# Patient Record
Sex: FEMALE | Race: WHITE | URBAN - METROPOLITAN AREA
[De-identification: names, ages, dates, MRNs, and addresses within clinical notes are randomized per-mention and may not be internally consistent; named-entity substitution may affect disease eponyms.]

---

## 2017-04-19 ENCOUNTER — OFFICE VISIT (OUTPATIENT)
Dept: INTERNAL MEDICINE CLINIC | Age: 52
End: 2017-04-19

## 2017-04-19 VITALS
HEIGHT: 69 IN | OXYGEN SATURATION: 95 % | RESPIRATION RATE: 20 BRPM | SYSTOLIC BLOOD PRESSURE: 141 MMHG | DIASTOLIC BLOOD PRESSURE: 87 MMHG | TEMPERATURE: 97.7 F | WEIGHT: 293 LBS | BODY MASS INDEX: 43.4 KG/M2 | HEART RATE: 95 BPM

## 2017-04-19 DIAGNOSIS — R60.9 EDEMA, UNSPECIFIED TYPE: ICD-10-CM

## 2017-04-19 DIAGNOSIS — I10 ESSENTIAL HYPERTENSION: ICD-10-CM

## 2017-04-19 DIAGNOSIS — Z12.31 SCREENING MAMMOGRAM, ENCOUNTER FOR: ICD-10-CM

## 2017-04-19 DIAGNOSIS — E55.9 VITAMIN D DEFICIENCY: ICD-10-CM

## 2017-04-19 DIAGNOSIS — H66.93 BILATERAL OTITIS MEDIA, UNSPECIFIED CHRONICITY, UNSPECIFIED OTITIS MEDIA TYPE: Primary | ICD-10-CM

## 2017-04-19 DIAGNOSIS — R73.03 PRE-DIABETES: ICD-10-CM

## 2017-04-19 DIAGNOSIS — J45.909 UNCOMPLICATED ASTHMA, UNSPECIFIED ASTHMA SEVERITY: ICD-10-CM

## 2017-04-19 RX ORDER — METFORMIN HYDROCHLORIDE 500 MG/1
500 TABLET ORAL 2 TIMES DAILY WITH MEALS
Qty: 60 TAB | Refills: 2 | Status: SHIPPED | OUTPATIENT
Start: 2017-04-19

## 2017-04-19 RX ORDER — AMOXICILLIN AND CLAVULANATE POTASSIUM 875; 125 MG/1; MG/1
1 TABLET, FILM COATED ORAL 2 TIMES DAILY
Qty: 20 TAB | Refills: 0 | Status: SHIPPED | OUTPATIENT
Start: 2017-04-19 | End: 2017-04-29

## 2017-04-19 RX ORDER — LOSARTAN POTASSIUM 50 MG/1
50 TABLET ORAL DAILY
Qty: 30 TAB | Refills: 2 | Status: SHIPPED | OUTPATIENT
Start: 2017-04-19 | End: 2017-07-14 | Stop reason: SDUPTHER

## 2017-04-19 RX ORDER — BUDESONIDE AND FORMOTEROL FUMARATE DIHYDRATE 160; 4.5 UG/1; UG/1
2 AEROSOL RESPIRATORY (INHALATION) 2 TIMES DAILY
Qty: 1 INHALER | Refills: 3 | Status: SHIPPED | OUTPATIENT
Start: 2017-04-19 | End: 2017-07-14 | Stop reason: SDUPTHER

## 2017-04-19 RX ORDER — ERGOCALCIFEROL 1.25 MG/1
50000 CAPSULE ORAL
Qty: 4 CAP | Refills: 2 | Status: SHIPPED | OUTPATIENT
Start: 2017-04-19 | End: 2017-07-21 | Stop reason: SDUPTHER

## 2017-04-19 RX ORDER — FUROSEMIDE 20 MG/1
TABLET ORAL
Qty: 30 TAB | Refills: 1 | Status: SHIPPED | OUTPATIENT
Start: 2017-04-19 | End: 2017-07-14 | Stop reason: SDUPTHER

## 2017-04-19 NOTE — PROGRESS NOTES
HISTORY OF PRESENT ILLNESS  Olga Cueva is a 47 y.o. female. HPI Ms. Alejandro Barrera is here for c/o Left ear pain. She also has been off all her meds for several months and just restarted Monday. She does complain of leg cramping when she takes the maxzide or lasix. She was seen recently at [facility] for cellulitis of her leg and she mentioned the leg cramping. Labs were done and were normal.  She was treated with Keflex and is still on it. I will d/c maxzide due to cramping she experiences every time she take it and replace with alternative medication. She does not want an ACE b/c she believes she had a rash or developed some redness on her arm after taking it in the past.     Review of Systems   Constitutional: Negative. HENT: Positive for ear pain. Eyes: Negative. Respiratory: Positive for wheezing (occasional with asthma). Negative for cough. Cardiovascular: Positive for leg swelling. Gastrointestinal: Negative. Musculoskeletal: Positive for myalgias (intermittent leg cramping. ). Physical Exam   Constitutional: She is oriented to person, place, and time. She appears well-developed and well-nourished. No distress. HENT:   Head: Normocephalic and atraumatic. Right Ear: Tympanic membrane is erythematous. Left Ear: Tympanic membrane is erythematous (left ear is more severe than right). Cardiovascular: Normal rate and regular rhythm. No murmur heard. Pulmonary/Chest: Effort normal. She has no wheezes. Musculoskeletal: She exhibits edema. Neurological: She is alert and oriented to person, place, and time.      Visit Vitals    /87 (BP 1 Location: Left arm, BP Patient Position: Sitting)    Pulse 95    Temp 97.7 °F (36.5 °C) (Oral)    Resp 20    Ht 5' 9\" (1.753 m)    Wt 324 lb (147 kg)    SpO2 95%    BMI 47.85 kg/m2     Wt Readings from Last 3 Encounters:   04/19/17 324 lb (147 kg)   09/07/16 313 lb (142 kg)   07/15/16 310 lb (140.6 kg)       ASSESSMENT and PLAN    ICD-10-CM ICD-9-CM    1. Bilateral otitis media, unspecified chronicity, unspecified otitis media type H66.93 382.9 amoxicillin-clavulanate (AUGMENTIN) 875-125 mg per tablet   2. Screening mammogram, encounter for Z12.31 V76.12 [facility] MAMMO BI SCREENING INCL CAD   3. Vitamin D deficiency E55.9 268.9 ergocalciferol (VITAMIN D2) 50,000 unit capsule   4. Edema, unspecified type R60.9 782.3 furosemide (LASIX) 20 mg tablet   5. Essential hypertension I10 401.9 losartan (COZAAR) 50 mg tablet   6. Pre-diabetes R73.03 790.29 metFORMIN (GLUCOPHAGE) 500 mg tablet   7. Uncomplicated asthma, unspecified asthma severity J45.909 493.90 budesonide-formoterol (SYMBICORT) 160-4.5 mcg/actuation HFA inhaler   Again, labs not done today due to being off medications. Asked her to restart them all and return in 3 months  Pt verbalized understanding of their condition and diagnoses, treatment plan,  as well as side effects of any new medications prescribed.

## 2017-04-19 NOTE — PROGRESS NOTES
Miri Nash presents today at the clinic for      Chief Complaint   Patient presents with   Georganna Duverney Ear Pain     x3 days    Ear Fullness        Wt Readings from Last 3 Encounters:   04/19/17 324 lb (147 kg)   09/07/16 313 lb (142 kg)   07/15/16 310 lb (140.6 kg)     Temp Readings from Last 3 Encounters:   04/19/17 97.7 °F (36.5 °C) (Oral)   09/07/16 98.5 °F (36.9 °C) (Oral)   07/15/16 98.6 °F (37 °C) (Oral)     BP Readings from Last 3 Encounters:   04/19/17 141/87   09/07/16 141/70   07/15/16 124/83     Pulse Readings from Last 3 Encounters:   04/19/17 95   09/07/16 72   07/15/16 99       There are no preventive care reminders to display for this patient. Coordination of Care:    1. Have you been to the ER, urgent care clinic since your last visit? Hospitalized since your last visit? Yes [facility]    2. Have you seen or consulted any other health care providers outside of the [address] [place] [address] since your last visit? Include any pap smears or colon screening.  No

## 2017-04-19 NOTE — PATIENT INSTRUCTIONS

## 2017-05-05 DIAGNOSIS — Z12.31 SCREENING MAMMOGRAM, ENCOUNTER FOR: ICD-10-CM

## 2017-07-14 ENCOUNTER — OFFICE VISIT (OUTPATIENT)
Dept: INTERNAL MEDICINE CLINIC | Age: 52
End: 2017-07-14

## 2017-07-14 VITALS
HEIGHT: 69 IN | HEART RATE: 75 BPM | OXYGEN SATURATION: 96 % | SYSTOLIC BLOOD PRESSURE: 129 MMHG | RESPIRATION RATE: 18 BRPM | WEIGHT: 293 LBS | BODY MASS INDEX: 43.4 KG/M2 | DIASTOLIC BLOOD PRESSURE: 84 MMHG | TEMPERATURE: 98.1 F

## 2017-07-14 DIAGNOSIS — E03.9 ACQUIRED HYPOTHYROIDISM: Primary | ICD-10-CM

## 2017-07-14 DIAGNOSIS — R73.03 PRE-DIABETES: ICD-10-CM

## 2017-07-14 DIAGNOSIS — R60.9 EDEMA, UNSPECIFIED TYPE: ICD-10-CM

## 2017-07-14 DIAGNOSIS — I10 ESSENTIAL HYPERTENSION: ICD-10-CM

## 2017-07-14 DIAGNOSIS — L30.9 DERMATITIS: ICD-10-CM

## 2017-07-14 DIAGNOSIS — E55.9 VITAMIN D DEFICIENCY: ICD-10-CM

## 2017-07-14 DIAGNOSIS — M25.511 RIGHT SHOULDER PAIN, UNSPECIFIED CHRONICITY: ICD-10-CM

## 2017-07-14 DIAGNOSIS — M25.611 DECREASED ROM OF RIGHT SHOULDER: ICD-10-CM

## 2017-07-14 DIAGNOSIS — J45.909 UNCOMPLICATED ASTHMA, UNSPECIFIED ASTHMA SEVERITY: ICD-10-CM

## 2017-07-14 RX ORDER — ERGOCALCIFEROL 1.25 MG/1
50000 CAPSULE ORAL
Qty: 4 CAP | Refills: 5 | Status: CANCELLED | OUTPATIENT
Start: 2017-07-14

## 2017-07-14 RX ORDER — BUDESONIDE AND FORMOTEROL FUMARATE DIHYDRATE 160; 4.5 UG/1; UG/1
2 AEROSOL RESPIRATORY (INHALATION) 2 TIMES DAILY
Qty: 1 INHALER | Refills: 5 | Status: SHIPPED | OUTPATIENT
Start: 2017-07-14 | End: 2018-08-31 | Stop reason: SDUPTHER

## 2017-07-14 RX ORDER — IBUPROFEN 800 MG/1
800 TABLET ORAL
Qty: 60 TAB | Refills: 5 | Status: SHIPPED | OUTPATIENT
Start: 2017-07-14 | End: 2018-08-31 | Stop reason: SDUPTHER

## 2017-07-14 RX ORDER — FUROSEMIDE 20 MG/1
TABLET ORAL
Qty: 30 TAB | Refills: 5 | Status: SHIPPED | OUTPATIENT
Start: 2017-07-14

## 2017-07-14 RX ORDER — HYDROCORTISONE VALERATE 2 MG/G
CREAM TOPICAL 2 TIMES DAILY
Qty: 45 G | Refills: 1 | Status: SHIPPED | OUTPATIENT
Start: 2017-07-14

## 2017-07-14 RX ORDER — METFORMIN HYDROCHLORIDE 500 MG/1
500 TABLET ORAL 2 TIMES DAILY WITH MEALS
Qty: 60 TAB | Refills: 5 | Status: CANCELLED | OUTPATIENT
Start: 2017-07-14

## 2017-07-14 RX ORDER — LOSARTAN POTASSIUM 50 MG/1
50 TABLET ORAL DAILY
Qty: 30 TAB | Refills: 5 | Status: SHIPPED | OUTPATIENT
Start: 2017-07-14

## 2017-07-14 NOTE — PROGRESS NOTES
Chief Complaint   Patient presents with    Diabetes    Hypertension    Vitamin D Deficiency    Thyroid Problem     1. Have you been to the ER, urgent care clinic since your last visit? Hospitalized since your last visit? No    2. Have you seen or consulted any other health care providers outside of the [facility] since your last visit? Include any pap smears or colon screening.  No

## 2017-07-14 NOTE — PATIENT INSTRUCTIONS

## 2017-07-14 NOTE — PROGRESS NOTES
HISTORY OF PRESENT ILLNESS  Adi Underwood is a 47 y.o. female. HPI Adi Underwood is here for follow up on diabetes, HTN and hypercholesterolemia. Her weight is un-changed. She is working and going to school and admits she has no time to prepare meals. She had no side effects on metformin. She admits she did not take the cozaar b/c she was just leary of any side effects and doesn't feel she needs it. I advised her that ACe/ARB medications are recommended for diabetics due to renal protection benefits. Review of Systems   Constitutional: Negative. HENT: Negative. Eyes: Negative. Respiratory: Negative. Cardiovascular: Negative. Musculoskeletal: Positive for joint pain (shoulder - requesting motrin, did not want further evaluation at this time). Skin: Positive for rash (few dry, scaly lesions lower leg. She was originially treated for ringworm. I wonder with her leg swelling if it isn't a statsis dermatitis or eczema). Neurological: Negative. Physical Exam   Constitutional: She is oriented to person, place, and time. She appears well-developed and well-nourished. No distress. HENT:   Head: Normocephalic and atraumatic. Cardiovascular: Normal rate and regular rhythm. No murmur heard. Pulmonary/Chest: Effort normal and breath sounds normal.   Musculoskeletal: She exhibits edema (slight). Neurological: She is alert and oriented to person, place, and time. Skin:   Few patchy areas on feet, lower legs. Increased pigmentation, veins probably secondary to weight and standing and edema.    Could also be eczema     Visit Vitals    /84 (BP 1 Location: Left arm, BP Patient Position: Sitting)    Pulse 75    Temp 98.1 °F (36.7 °C) (Oral)    Resp 18    Ht 5' 9\" (1.753 m)    Wt 324 lb (147 kg)    SpO2 96%    BMI 47.85 kg/m2     Wt Readings from Last 3 Encounters:   07/14/17 324 lb (147 kg)   04/19/17 324 lb (147 kg)   09/07/16 313 lb (142 kg)         ASSESSMENT and PLAN    ICD-10-CM ICD-9-CM    1. Acquired hypothyroidism E03.9 244.9 TSH 3RD GENERATION   2. Uncomplicated asthma, unspecified asthma severity J45.909 493.90 budesonide-formoterol (SYMBICORT) 160-4.5 mcg/actuation HFA inhaler   3. Decreased ROM of right shoulder M25.611 719.51 ibuprofen (MOTRIN) 800 mg tablet   4. Right shoulder pain, unspecified chronicity M25.511 719.41 ibuprofen (MOTRIN) 800 mg tablet   5. Essential hypertension I10 401.9 losartan (COZAAR) 50 mg tablet      NE COLLECTION VENOUS BLOOD,VENIPUNCTURE      METABOLIC PANEL, COMPREHENSIVE      LIPID PANEL   6. Edema, unspecified type R60.9 782.3 furosemide (LASIX) 20 mg tablet   7. Pre-diabetes R73.03 790.29    8. Vitamin D deficiency E55.9 268.9 VITAMIN D, 25 HYDROXY   9. Dermatitis L30.9 692.9 hydrocortisone valerate (WESTCORT) 0.2 % topical cream   Recommend increase exercise, diet and weight reduction  Gave name of Dr. Donna Lynne with [facility] dermatology to contact for skin issues and skin exam.   Pt verbalized understanding of their condition and diagnoses, treatment plan,  as well as side effects of any new medications prescribed.

## 2017-07-15 LAB
25(OH)D3 SERPL-MCNC: 20.1 NG/ML (ref 32–100)
A-G RATIO,AGRAT: 1.3 RATIO (ref 1.1–2.6)
ALBUMIN SERPL-MCNC: 4.1 G/DL (ref 3.5–5)
ALP SERPL-CCNC: 82 U/L (ref 25–115)
ALT SERPL-CCNC: 21 U/L (ref 5–40)
ANION GAP SERPL CALC-SCNC: 15 MMOL/L
AST SERPL W P-5'-P-CCNC: 16 U/L (ref 10–37)
BILIRUB SERPL-MCNC: 0.3 MG/DL (ref 0.2–1.2)
BUN SERPL-MCNC: 12 MG/DL (ref 6–22)
CALCIUM SERPL-MCNC: 9 MG/DL (ref 8.4–10.5)
CHLORIDE SERPL-SCNC: 101 MMOL/L (ref 98–110)
CHOLEST SERPL-MCNC: 179 MG/DL (ref 110–200)
CO2 SERPL-SCNC: 27 MMOL/L (ref 20–32)
CREAT SERPL-MCNC: 0.6 MG/DL (ref 0.5–1.2)
GFRAA, 66117: >60
GFRNA, 66118: >60
GLOBULIN,GLOB: 3.2 G/DL (ref 2–4)
GLUCOSE SERPL-MCNC: 114 MG/DL (ref 65–99)
HDLC SERPL-MCNC: 38 MG/DL (ref 40–59)
LDLC SERPL CALC-MCNC: 120 MG/DL (ref 50–99)
POTASSIUM SERPL-SCNC: 4.8 MMOL/L (ref 3.5–5.5)
PROT SERPL-MCNC: 7.3 G/DL (ref 6.4–8.3)
SODIUM SERPL-SCNC: 143 MMOL/L (ref 133–145)
TRIGL SERPL-MCNC: 108 MG/DL (ref 40–149)
TSH SERPL DL<=0.005 MIU/L-ACNC: 3.05 MCU/ML (ref 0.27–4.2)
VLDLC SERPL CALC-MCNC: 22 MG/DL (ref 8–30)

## 2017-07-17 ENCOUNTER — TELEPHONE (OUTPATIENT)
Dept: INTERNAL MEDICINE CLINIC | Age: 52
End: 2017-07-17

## 2017-07-17 DIAGNOSIS — E11.9 TYPE 2 DIABETES MELLITUS WITHOUT COMPLICATION, WITHOUT LONG-TERM CURRENT USE OF INSULIN (HCC): Primary | ICD-10-CM

## 2017-07-17 LAB
AVG GLU, 10930: 129 MG/DL (ref 91–123)
HBA1C MFR BLD HPLC: 6.1 % (ref 4.8–5.9)

## 2017-07-17 NOTE — TELEPHONE ENCOUNTER
Ms Carmela Truong called requesting her A1C result. She said this done on 7/14 but system is showing today, 7/17.

## 2017-07-17 NOTE — TELEPHONE ENCOUNTER
Her A1c is the same at 6.1. That is acceptable and I wouldn't change anything now. She should stay on metformin for now and try to work on diet. Her vitamin D is low, so I will need to restart her on prescription vitamin D. She should have it rechecked before she runs out of the Vitamin D so we can see if it improved it at all. Her cholesterol is slightly elevated. If she wasn't fasting, that may be part of it.  Otherwise, the same thing - work on healthier eating, trying to increase physical activity etc.

## 2017-07-21 DIAGNOSIS — E55.9 VITAMIN D DEFICIENCY: ICD-10-CM

## 2017-07-21 RX ORDER — ERGOCALCIFEROL 1.25 MG/1
50000 CAPSULE ORAL
Qty: 4 CAP | Refills: 2 | Status: SHIPPED | OUTPATIENT
Start: 2017-07-21

## 2017-08-11 NOTE — TELEPHONE ENCOUNTER
Ms Lisa Kincaid called requesting to speak with Farhad Harder about her thyroid. And, some other things. I'm putting through request for her Synthroid. She said she use to get this at a specialist but wants Achilles Harder to start witting this.

## 2017-08-16 NOTE — TELEPHONE ENCOUNTER
I want to make sure she is taking 150 mcgs of synthroid.    They make a 150 mcg, so I wasn't sure why its entered for 100mcgs and take 1 1/2 tabs  Then it says in the unsigned order that we can't make 125mcgs out of the 100 mcg tab

## 2017-08-21 RX ORDER — LEVOTHYROXINE SODIUM 125 UG/1
125 TABLET ORAL
Qty: 90 TAB | Refills: 1 | Status: SHIPPED | COMMUNITY
Start: 2017-08-21

## 2018-02-08 ENCOUNTER — TELEPHONE (OUTPATIENT)
Dept: INTERNAL MEDICINE CLINIC | Age: 53
End: 2018-02-08

## 2018-02-09 NOTE — TELEPHONE ENCOUNTER
Spoke with patient and advised that per Donna Stephens and last office note she was supposed to follow up in Oct 2017 and would be happy to discuss any issues she has at her appointment. Patient refused to make an appointment at this time.

## 2018-02-09 NOTE — TELEPHONE ENCOUNTER
Pt does not want to speak with anyone but provider tried making pt an appt she refused. Was told by provider to let  know to call pt as she needs to be seen.

## 2018-08-31 DIAGNOSIS — M25.511 RIGHT SHOULDER PAIN, UNSPECIFIED CHRONICITY: ICD-10-CM

## 2018-08-31 DIAGNOSIS — M25.611 DECREASED ROM OF RIGHT SHOULDER: ICD-10-CM

## 2018-09-04 RX ORDER — IBUPROFEN 800 MG/1
800 TABLET ORAL
Qty: 60 TAB | Refills: 3 | Status: SHIPPED | OUTPATIENT
Start: 2018-09-04

## 2018-09-04 RX ORDER — BUDESONIDE AND FORMOTEROL FUMARATE DIHYDRATE 160; 4.5 UG/1; UG/1
2 AEROSOL RESPIRATORY (INHALATION) 2 TIMES DAILY
Qty: 1 INHALER | Refills: 3 | Status: SHIPPED | OUTPATIENT
Start: 2018-09-04

## 2018-09-04 NOTE — TELEPHONE ENCOUNTER
From: Marcio Mcallister  To: Octaviano Campos PA-C  Sent: 8/31/2018 8:24 PM EDT  Subject: Medication Renewal Request    Original authorizing provider: ANDREI Oquendo would like a refill of the following medications:  budesonide-formoterol (SYMBICORT) 160-4.5 mcg/actuation HFA inhaler Joce Shields PA-C]  ibuprofen (MOTRIN) 800 mg tablet Octaviano Campos PA-C]    Preferred pharmacy: [address] [place], [place] [address]e:  Shanda Lagos Could you please call in 4 refills on my Symbicort inhaler per our conversation today. I will make an appointment to come in for my check up in January.  Lefty Erickson get past the boards! :) Thank you so much